# Patient Record
Sex: FEMALE | Race: WHITE
[De-identification: names, ages, dates, MRNs, and addresses within clinical notes are randomized per-mention and may not be internally consistent; named-entity substitution may affect disease eponyms.]

---

## 2018-03-16 ENCOUNTER — HOSPITAL ENCOUNTER (OUTPATIENT)
Dept: HOSPITAL 61 - KCIC US | Age: 53
Discharge: HOME | End: 2018-03-16
Attending: FAMILY MEDICINE
Payer: COMMERCIAL

## 2018-03-16 DIAGNOSIS — R60.0: ICD-10-CM

## 2018-03-16 DIAGNOSIS — M17.12: Primary | ICD-10-CM

## 2018-03-16 PROCEDURE — 93971 EXTREMITY STUDY: CPT

## 2018-03-16 PROCEDURE — 73562 X-RAY EXAM OF KNEE 3: CPT

## 2020-07-29 ENCOUNTER — HOSPITAL ENCOUNTER (OUTPATIENT)
Dept: HOSPITAL 61 - KCIC MAMMO | Age: 55
Discharge: HOME | End: 2020-07-29
Attending: NURSE PRACTITIONER
Payer: COMMERCIAL

## 2020-07-29 DIAGNOSIS — Z12.31: Primary | ICD-10-CM

## 2020-07-29 DIAGNOSIS — N64.89: ICD-10-CM

## 2020-07-29 PROCEDURE — 77067 SCR MAMMO BI INCL CAD: CPT

## 2020-07-29 NOTE — [facility]
EXAM: Bilateral screening mammogram.

 

HISTORY: 55-year-old female presents for screening mammography.

 

TECHNIQUE: Full-field digital craniocaudal and mediolateral oblique views 

of both breasts are obtained for evaluation. Computer aided detection with

BMEYED software version 9.3 was applied.

 

COMPARISON: None. This is baseline mammogram.

 

BREAST PARENCHYMAL DENSITY: Level B - Scattered fibroglandular densities.

 

FINDINGS: There is no suspicious mass, calcification or distortion within 

either breast. There are few benign calcifications bilaterally.

 

IMPRESSION: BI-RADS Category 2: Benign finding(s). 

 

RECOMMENDATION: Annual mammography is recommended.

 

If your mammogram demonstrates that you have dense breast tissue, which 

could hide abnormalities, and if you have other risk factors for breast 

cancer that have been identified, you might benefit from supplemental 

screening tests that may be suggested by your ordering physician.  Dense 

breast tissue, in and of itself, is a relatively common condition.  This 

information is not provided to cause undue concern, but rather to raise 

your awareness and to promote discussion with your physician regarding the

presence of other risk factors, in addition to dense breast tissue. A 

report of your mammography results will be sent to you and your physician.

You should contact your physician if you have any questions or concerns 

regarding this report.  

 

Mammography is a sensitive method for finding small breast cancers, but it

does not detect them all and is not a substitute for careful clinical 

examination.  A negative mammogram does not negate a clinically suspicious

finding and should not result in delay in biopsying a clinically 

suspicious abnormality.

 

PQRS compliance statement -  Patient information was entered into a 

reminder system with a target due date for the next mammogram. 

 

"Our facility is accredited by the [employer] 

Mammography Program."

 

Electronically signed by: Joanna Diamond MD (7/29/2020 3:41 PM) [email or web address]

## 2020-09-18 ENCOUNTER — HOSPITAL ENCOUNTER (OUTPATIENT)
Dept: HOSPITAL 61 - ER | Age: 55
Setting detail: OBSERVATION
LOS: 1 days | Discharge: HOME | End: 2020-09-19
Attending: STUDENT IN AN ORGANIZED HEALTH CARE EDUCATION/TRAINING PROGRAM | Admitting: STUDENT IN AN ORGANIZED HEALTH CARE EDUCATION/TRAINING PROGRAM
Payer: COMMERCIAL

## 2020-09-18 VITALS — BODY MASS INDEX: 39.33 KG/M2 | HEIGHT: 66 IN | WEIGHT: 244.71 LBS

## 2020-09-18 VITALS — DIASTOLIC BLOOD PRESSURE: 60 MMHG | SYSTOLIC BLOOD PRESSURE: 132 MMHG

## 2020-09-18 VITALS — SYSTOLIC BLOOD PRESSURE: 131 MMHG | DIASTOLIC BLOOD PRESSURE: 59 MMHG

## 2020-09-18 VITALS — DIASTOLIC BLOOD PRESSURE: 51 MMHG | SYSTOLIC BLOOD PRESSURE: 125 MMHG

## 2020-09-18 VITALS — SYSTOLIC BLOOD PRESSURE: 137 MMHG | DIASTOLIC BLOOD PRESSURE: 65 MMHG

## 2020-09-18 DIAGNOSIS — G47.33: ICD-10-CM

## 2020-09-18 DIAGNOSIS — I48.91: ICD-10-CM

## 2020-09-18 DIAGNOSIS — R07.89: ICD-10-CM

## 2020-09-18 DIAGNOSIS — N17.0: ICD-10-CM

## 2020-09-18 DIAGNOSIS — Z90.710: ICD-10-CM

## 2020-09-18 DIAGNOSIS — U07.1: Primary | ICD-10-CM

## 2020-09-18 DIAGNOSIS — I10: ICD-10-CM

## 2020-09-18 LAB
ANION GAP SERPL CALC-SCNC: 10 MMOL/L (ref 6–14)
BASOPHILS # BLD AUTO: 0 X10^3/UL (ref 0–0.2)
BASOPHILS NFR BLD: 0 % (ref 0–3)
BUN SERPL-MCNC: 17 MG/DL (ref 7–20)
CALCIUM SERPL-MCNC: 9.4 MG/DL (ref 8.5–10.1)
CHLORIDE SERPL-SCNC: 100 MMOL/L (ref 98–107)
CO2 SERPL-SCNC: 24 MMOL/L (ref 21–32)
CREAT SERPL-MCNC: 1.2 MG/DL (ref 0.6–1)
EOSINOPHIL NFR BLD: 0 X10^3/UL (ref 0–0.7)
EOSINOPHIL NFR BLD: 1 % (ref 0–3)
ERYTHROCYTE [DISTWIDTH] IN BLOOD BY AUTOMATED COUNT: 14.1 % (ref 11.5–14.5)
GFR SERPLBLD BASED ON 1.73 SQ M-ARVRAT: 46.6 ML/MIN
GLUCOSE SERPL-MCNC: 204 MG/DL (ref 70–99)
HCT VFR BLD CALC: 40.1 % (ref 36–47)
HGB BLD-MCNC: 13.5 G/DL (ref 12–15.5)
LYMPHOCYTES # BLD: 1 X10^3/UL (ref 1–4.8)
LYMPHOCYTES NFR BLD AUTO: 17 % (ref 24–48)
MCH RBC QN AUTO: 29 PG (ref 25–35)
MCHC RBC AUTO-ENTMCNC: 34 G/DL (ref 31–37)
MCV RBC AUTO: 85 FL (ref 79–100)
MONO #: 0.3 X10^3/UL (ref 0–1.1)
MONOCYTES NFR BLD: 5 % (ref 0–9)
NEUT #: 4.4 X10^3/UL (ref 1.8–7.7)
NEUTROPHILS NFR BLD AUTO: 77 % (ref 31–73)
PLATELET # BLD AUTO: 182 X10^3/UL (ref 140–400)
POTASSIUM SERPL-SCNC: 4.6 MMOL/L (ref 3.5–5.1)
RBC # BLD AUTO: 4.7 X10^6/UL (ref 3.5–5.4)
SODIUM SERPL-SCNC: 134 MMOL/L (ref 136–145)
WBC # BLD AUTO: 5.7 X10^3/UL (ref 4–11)

## 2020-09-18 PROCEDURE — 96374 THER/PROPH/DIAG INJ IV PUSH: CPT

## 2020-09-18 PROCEDURE — 93005 ELECTROCARDIOGRAM TRACING: CPT

## 2020-09-18 PROCEDURE — 84484 ASSAY OF TROPONIN QUANT: CPT

## 2020-09-18 PROCEDURE — 80048 BASIC METABOLIC PNL TOTAL CA: CPT

## 2020-09-18 PROCEDURE — 99285 EMERGENCY DEPT VISIT HI MDM: CPT

## 2020-09-18 PROCEDURE — 71045 X-RAY EXAM CHEST 1 VIEW: CPT

## 2020-09-18 PROCEDURE — 85379 FIBRIN DEGRADATION QUANT: CPT

## 2020-09-18 PROCEDURE — 85025 COMPLETE CBC W/AUTO DIFF WBC: CPT

## 2020-09-18 PROCEDURE — 96361 HYDRATE IV INFUSION ADD-ON: CPT

## 2020-09-18 PROCEDURE — G0379 DIRECT REFER HOSPITAL OBSERV: HCPCS

## 2020-09-18 PROCEDURE — 84702 CHORIONIC GONADOTROPIN TEST: CPT

## 2020-09-18 PROCEDURE — 36415 COLL VENOUS BLD VENIPUNCTURE: CPT

## 2020-09-18 PROCEDURE — G0378 HOSPITAL OBSERVATION PER HR: HCPCS

## 2020-09-18 RX ADMIN — OXYCODONE HYDROCHLORIDE AND ACETAMINOPHEN SCH MG: 500 TABLET ORAL at 21:25

## 2020-09-18 RX ADMIN — OXYCODONE HYDROCHLORIDE AND ACETAMINOPHEN SCH MG: 500 TABLET ORAL at 15:38

## 2020-09-18 NOTE — PDOC3
Discharge Summary


Visit Information


Date of Admission:  Sep 18, 2020


Date of Discharge:  Sep 19, 2020


Final Diagnosis


Problems


Medical Problems:


(1) EVERARDO (acute kidney injury)


Status: Acute  





(2) Chest pain


Status: Acute  





(3) COVID-19


Status: Acute  











Brief Hospital Course


Allergies





                                    Allergies








Coded Allergies Type Severity Reaction Last Updated Verified


 


  No Known Drug Allergies    9/18/20 No








Vital Signs





Vital Signs








  Date Time  Temp Pulse Resp B/P (MAP) Pulse Ox O2 Delivery O2 Flow Rate FiO2


 


9/18/20 15:02 97.6 62 18 131/59 (83) 95 Room Air  





 97.6       








Lab Results





Laboratory Tests








Test


 9/18/20


10:14 9/18/20


13:23 9/18/20


16:05


 


White Blood Count


 5.7 x10^3/uL


(4.0-11.0) 


 





 


Red Blood Count


 4.70 x10^6/uL


(3.50-5.40) 


 





 


Hemoglobin


 13.5 g/dL


(12.0-15.5) 


 





 


Hematocrit


 40.1 %


(36.0-47.0) 


 





 


Mean Corpuscular Volume 85 fL ()   


 


Mean Corpuscular Hemoglobin 29 pg (25-35)   


 


Mean Corpuscular Hemoglobin


Concent 34 g/dL


(31-37) 


 





 


Red Cell Distribution Width


 14.1 %


(11.5-14.5) 


 





 


Platelet Count


 182 x10^3/uL


(140-400) 


 





 


Neutrophils (%) (Auto) 77 % (31-73)   


 


Lymphocytes (%) (Auto) 17 % (24-48)   


 


Monocytes (%) (Auto) 5 % (0-9)   


 


Eosinophils (%) (Auto) 1 % (0-3)   


 


Basophils (%) (Auto) 0 % (0-3)   


 


Neutrophils # (Auto)


 4.4 x10^3/uL


(1.8-7.7) 


 





 


Lymphocytes # (Auto)


 1.0 x10^3/uL


(1.0-4.8) 


 





 


Monocytes # (Auto)


 0.3 x10^3/uL


(0.0-1.1) 


 





 


Eosinophils # (Auto)


 0.0 x10^3/uL


(0.0-0.7) 


 





 


Basophils # (Auto)


 0.0 x10^3/uL


(0.0-0.2) 


 





 


D-Dimer (Sonya)


 0.39 ug/mlFEU


(0.00-0.50) 


 





 


Maternal Serum HCG Beta


Subunit 1 mIU/mL (0-5) 


 


 





 


Sodium Level


 134 mmol/L


(136-145) 


 





 


Potassium Level


 4.6 mmol/L


(3.5-5.1) 


 





 


Chloride Level


 100 mmol/L


() 


 





 


Carbon Dioxide Level


 24 mmol/L


(21-32) 


 





 


Anion Gap 10 (6-14)   


 


Blood Urea Nitrogen


 17 mg/dL


(7-20) 


 





 


Creatinine


 1.2 mg/dL


(0.6-1.0) 


 





 


Estimated GFR


(Cockcroft-Gault) 46.6 


 


 





 


Glucose Level


 204 mg/dL


(70-99) 


 





 


Calcium Level


 9.4 mg/dL


(8.5-10.1) 


 





 


Troponin I Quantitative


 < 0.017 ng/mL


(0.000-0.055) < 0.017 ng/mL


(0.000-0.055) < 0.017 ng/mL


(0.000-0.055)








Laboratory Tests








Test


 9/18/20


10:14 9/18/20


13:23 9/18/20


16:05


 


White Blood Count


 5.7 x10^3/uL


(4.0-11.0) 


 





 


Red Blood Count


 4.70 x10^6/uL


(3.50-5.40) 


 





 


Hemoglobin


 13.5 g/dL


(12.0-15.5) 


 





 


Hematocrit


 40.1 %


(36.0-47.0) 


 





 


Mean Corpuscular Volume 85 fL ()   


 


Mean Corpuscular Hemoglobin 29 pg (25-35)   


 


Mean Corpuscular Hemoglobin


Concent 34 g/dL


(31-37) 


 





 


Red Cell Distribution Width


 14.1 %


(11.5-14.5) 


 





 


Platelet Count


 182 x10^3/uL


(140-400) 


 





 


Neutrophils (%) (Auto) 77 % (31-73)   


 


Lymphocytes (%) (Auto) 17 % (24-48)   


 


Monocytes (%) (Auto) 5 % (0-9)   


 


Eosinophils (%) (Auto) 1 % (0-3)   


 


Basophils (%) (Auto) 0 % (0-3)   


 


Neutrophils # (Auto)


 4.4 x10^3/uL


(1.8-7.7) 


 





 


Lymphocytes # (Auto)


 1.0 x10^3/uL


(1.0-4.8) 


 





 


Monocytes # (Auto)


 0.3 x10^3/uL


(0.0-1.1) 


 





 


Eosinophils # (Auto)


 0.0 x10^3/uL


(0.0-0.7) 


 





 


Basophils # (Auto)


 0.0 x10^3/uL


(0.0-0.2) 


 





 


D-Dimer (Sonya)


 0.39 ug/mlFEU


(0.00-0.50) 


 





 


Maternal Serum HCG Beta


Subunit 1 mIU/mL (0-5) 


 


 





 


Sodium Level


 134 mmol/L


(136-145) 


 





 


Potassium Level


 4.6 mmol/L


(3.5-5.1) 


 





 


Chloride Level


 100 mmol/L


() 


 





 


Carbon Dioxide Level


 24 mmol/L


(21-32) 


 





 


Anion Gap 10 (6-14)   


 


Blood Urea Nitrogen


 17 mg/dL


(7-20) 


 





 


Creatinine


 1.2 mg/dL


(0.6-1.0) 


 





 


Estimated GFR


(Cockcroft-Gault) 46.6 


 


 





 


Glucose Level


 204 mg/dL


(70-99) 


 





 


Calcium Level


 9.4 mg/dL


(8.5-10.1) 


 





 


Troponin I Quantitative


 < 0.017 ng/mL


(0.000-0.055) < 0.017 ng/mL


(0.000-0.055) < 0.017 ng/mL


(0.000-0.055)








Brief Hospital Course


Ms. Hauser  is a 55 old female who presented with Chest pain. Troponins were 

negative x 3.  Discussed with patient is likely her symptoms are due to her 

COVID-19 infection.  She was stable for discharge, recommend self quarantine and

return to ER if any new symptoms develop.





Discharge Information


Condition at Discharge:  Stable


Disposition/Orders:  D/C to Home


Scheduled


Sulfamethoxazole/Trimethoprim (Sulfamethoxazole-Tmp Ss Tablet) 1 Each Tablet, 1 

TAB PO BID for left leg infection for 3 Days, #6 Ref 0 (Reported)


   Entered as Reported by: DOREEN GONZALES on 9/18/20 1518


   Last Taken: Unknown Dose on 9/18/20 0800     Last Action: New Order on 

9/18/20 1518 by DOREEN GONZALES





Justicifation of Admission Dx:


Justifications for Admission:


Justification of Admission Dx:  Yes


Hypertension:  Cresendo Worsening of Sym











HARJINDER BREWER MD            Sep 18, 2020 19:16

## 2020-09-18 NOTE — EKG
[facility]

              [address] [place] [address]

Test Date:    2020               Test Time:    09:48:44

Pat Name:     RICHY RAHMAN              Department:   

Patient ID:   [id number]           Room:          

Gender:       F                        Technician:   

:          1965               Requested By: TAMMY MARTELL

Order Number: [id number]           Reading MD:     

                                 Measurements

Intervals                              Axis          

Rate:         68                       P:            53

ME:           150                      QRS:          37

QRSD:         100                      T:            109

QT:           408                                    

QTc:          434                                    

                           Interpretive Statements

SINUS RHYTHM

LEFT ATRIAL ABNORMALITY

T ABNORMALITY IN HIGH LATERAL LEADS

ABNORMAL ECG

RI6.02

No previous ECG available for comparison

## 2020-09-18 NOTE — PDOC1
History and Physical


Date of Admission


Date of Admission


DATE: 9/18/20 


TIME: 12:22





Identification/Chief Complaint


Chief Complaint


Chest pain





Source


Source:  Patient





History of Present Illness


History of Present Illness


Patient is a 55-year-old female with past medical history of obstructive sleep 

apnea and A. fib, who presents to the ER with chest heaviness for 1 day.  She 

reports substernal chest heaviness, but denies significant pain.  She denies any

pleuritic symptoms.  She admits to some associated nausea yesterday.  She does 

not take any medications for history of A. fib.  She denies any shortness of 

breath.  States she was COVID-19 positive one week ago, she is positive on swab 

in ER.





Past Medical History


Past Medical History


CHARITY, A. fib





Past Surgical History


Past Surgical History


Hysterectomy





Family History


Family History:  Coronary Artery Disease





Social History


Smoke:  No


ALCOHOL:  none


Drugs:  None





Current Problem List


Problem List


Problems


Medical Problems:


(1) EVERARDO (acute kidney injury)


Status: Acute  





(2) Chest pain


Status: Acute  





(3) COVID-19


Status: Acute  











Current Medications


Current Medications





Current Medications


Sodium Chloride 1,000 ml @  1,000 mls/hr 1X  ONCE IV  Last administered on 

9/18/20at 10:39;  Start 9/18/20 at 10:30;  Stop 9/18/20 at 11:29;  Status DC


Ondansetron HCl (Zofran) 4 mg 1X  ONCE IVP  Last administered on 9/18/20at 

10:39;  Start 9/18/20 at 10:30;  Stop 9/18/20 at 10:31;  Status DC


Ibuprofen (Motrin) 600 mg 1X  ONCE PO  Last administered on 9/18/20at 10:38;  

Start 9/18/20 at 10:30;  Stop 9/18/20 at 10:31;  Status DC


Magnesium Sulfate 50 ml @ 50 mls/hr 1X  ONCE IV ;  Start 9/18/20 at 10:30;  Stop

9/18/20 at 10:32;  Status DC


Aspirin (Nirmala Aspirin) 325 mg 1X  ONCE PO  Last administered on 9/18/20at 

11:50;  Start 9/18/20 at 11:15;  Stop 9/18/20 at 11:16;  Status DC





Allergies


Allergies:  


Coded Allergies:  


     No Known Drug Allergies (Unverified , 9/18/20)





ROS


Review of System


GENERAL:  No history of weight change, weakness or fevers.


SKIN:  No bruising, hair changes or rashes.


EYES:  No blurred, double or loss of vision.


NOSE AND THROAT:  No history of nosebleeds, hoarseness or sore throat.


HEART:  Chest pressure, denies palpitations.


LUNGS:  Denies cough, hemoptysis, wheezing or shortness of breath.


GASTROINTESTINAL: Nausea. Denies abdominal pain.


GENITOURINARY: Denies dysuria, frequency, urgency, hematuria.


NEUROLOGIC:  Denies history of numbness, tingling, tremor or weakness.


PSYCHIATRIC: Denies anxiety, denies depression.


ENDOCRINE:  No history of heat or cold intolerance, polyuria or polydipsia.


EXTREMITIES:  Denies muscle weakness, joint pain, pain on walking or stiffness.





Physical Exam


Physical Exam


General:  Alert, Oriented X3, Cooperative, No acute distress


HEENT:  PERRLA, EOMI


Lungs:  Clear to auscultation, Normal air movement


Heart:  RRR, no murmurs


Cardiovascular:  S1, S2


Abdomen:  Normal bowel sounds, Soft, No tenderness


Extremities:  No clubbing, No cyanosis


Skin:  No rashes, No significant lesion


Neuro:  Normal speech, Normal tone, Sensation intact


Psych/Mental Status:  Mental status NL, Mood NL





Vitals


Vitals





Vital Signs








  Date Time  Temp Pulse Resp B/P (MAP) Pulse Ox O2 Delivery O2 Flow Rate FiO2


 


9/18/20 10:03  68 16 137/64 (88) 97 Room Air  











Labs


Labs





Laboratory Tests








Test


 9/18/20


10:14


 


White Blood Count


 5.7 x10^3/uL


(4.0-11.0)


 


Red Blood Count


 4.70 x10^6/uL


(3.50-5.40)


 


Hemoglobin


 13.5 g/dL


(12.0-15.5)


 


Hematocrit


 40.1 %


(36.0-47.0)


 


Mean Corpuscular Volume 85 fL () 


 


Mean Corpuscular Hemoglobin 29 pg (25-35) 


 


Mean Corpuscular Hemoglobin


Concent 34 g/dL


(31-37)


 


Red Cell Distribution Width


 14.1 %


(11.5-14.5)


 


Platelet Count


 182 x10^3/uL


(140-400)


 


Neutrophils (%) (Auto) 77 % (31-73) 


 


Lymphocytes (%) (Auto) 17 % (24-48) 


 


Monocytes (%) (Auto) 5 % (0-9) 


 


Eosinophils (%) (Auto) 1 % (0-3) 


 


Basophils (%) (Auto) 0 % (0-3) 


 


Neutrophils # (Auto)


 4.4 x10^3/uL


(1.8-7.7)


 


Lymphocytes # (Auto)


 1.0 x10^3/uL


(1.0-4.8)


 


Monocytes # (Auto)


 0.3 x10^3/uL


(0.0-1.1)


 


Eosinophils # (Auto)


 0.0 x10^3/uL


(0.0-0.7)


 


Basophils # (Auto)


 0.0 x10^3/uL


(0.0-0.2)


 


D-Dimer (Sonya)


 0.39 ug/mlFEU


(0.00-0.50)


 


Maternal Serum HCG Beta


Subunit 1 mIU/mL (0-5) 





 


Sodium Level


 134 mmol/L


(136-145)


 


Potassium Level


 4.6 mmol/L


(3.5-5.1)


 


Chloride Level


 100 mmol/L


()


 


Carbon Dioxide Level


 24 mmol/L


(21-32)


 


Anion Gap 10 (6-14) 


 


Blood Urea Nitrogen


 17 mg/dL


(7-20)


 


Creatinine


 1.2 mg/dL


(0.6-1.0)


 


Estimated GFR


(Cockcroft-Gault) 46.6 





 


Glucose Level


 204 mg/dL


(70-99)


 


Calcium Level


 9.4 mg/dL


(8.5-10.1)


 


Troponin I Quantitative


 < 0.017 ng/mL


(0.000-0.055)








Laboratory Tests








Test


 9/18/20


10:14


 


White Blood Count


 5.7 x10^3/uL


(4.0-11.0)


 


Red Blood Count


 4.70 x10^6/uL


(3.50-5.40)


 


Hemoglobin


 13.5 g/dL


(12.0-15.5)


 


Hematocrit


 40.1 %


(36.0-47.0)


 


Mean Corpuscular Volume 85 fL () 


 


Mean Corpuscular Hemoglobin 29 pg (25-35) 


 


Mean Corpuscular Hemoglobin


Concent 34 g/dL


(31-37)


 


Red Cell Distribution Width


 14.1 %


(11.5-14.5)


 


Platelet Count


 182 x10^3/uL


(140-400)


 


Neutrophils (%) (Auto) 77 % (31-73) 


 


Lymphocytes (%) (Auto) 17 % (24-48) 


 


Monocytes (%) (Auto) 5 % (0-9) 


 


Eosinophils (%) (Auto) 1 % (0-3) 


 


Basophils (%) (Auto) 0 % (0-3) 


 


Neutrophils # (Auto)


 4.4 x10^3/uL


(1.8-7.7)


 


Lymphocytes # (Auto)


 1.0 x10^3/uL


(1.0-4.8)


 


Monocytes # (Auto)


 0.3 x10^3/uL


(0.0-1.1)


 


Eosinophils # (Auto)


 0.0 x10^3/uL


(0.0-0.7)


 


Basophils # (Auto)


 0.0 x10^3/uL


(0.0-0.2)


 


D-Dimer (Sonya)


 0.39 ug/mlFEU


(0.00-0.50)


 


Maternal Serum HCG Beta


Subunit 1 mIU/mL (0-5) 





 


Sodium Level


 134 mmol/L


(136-145)


 


Potassium Level


 4.6 mmol/L


(3.5-5.1)


 


Chloride Level


 100 mmol/L


()


 


Carbon Dioxide Level


 24 mmol/L


(21-32)


 


Anion Gap 10 (6-14) 


 


Blood Urea Nitrogen


 17 mg/dL


(7-20)


 


Creatinine


 1.2 mg/dL


(0.6-1.0)


 


Estimated GFR


(Cockcroft-Gault) 46.6 





 


Glucose Level


 204 mg/dL


(70-99)


 


Calcium Level


 9.4 mg/dL


(8.5-10.1)


 


Troponin I Quantitative


 < 0.017 ng/mL


(0.000-0.055)











Images


Images


EXAMINATION: CHEST AP ONLY


 


CLINICAL HISTORY: Reason: CP / Spl. Instructions:  / History: 


 


EXAM DATE/TIME: 9/18/2020 10:36 AM


 


COMPARISON: None


 


 


FINDINGS: 


 


Lines, Tubes, and Devices: None.


 


Cardiomediastinal Silhouette: Within normal limits.


 


Lungs and Pleura: No evidence of focal airspace consolidation or pleural 


effusion.  Pulmonary vasculature unremarkable.


 


Bones and Soft Tissues: Degenerative changes of the thoracic spine.


 


 


IMPRESSION:


 


No evidence of acute cardiopulmonary abnormality.





VTE Prophylaxis Ordered


VTE Prophylaxis Devices:  Yes


VTE Pharmacological Prophylaxi:  No





Assessment/Plan


Assessment/Plan


ACS


AKA


Vasomotor nephropathy


COVID-19 positive





Plan: We will trend cardiac troponins


Consult cardiology if indicated


Patient is COVID-19 positive, but denies any shortness of breath


If serial troponins remain undetectable, will discharge patient with outpatient 

follow-up


VTE prophylaxis


Cardiac diet


Full code





Justifications for Admission


Other Justification














HARJINDER BREWER MD            Sep 18, 2020 12:49

## 2020-09-18 NOTE — RAD
EXAMINATION: CHEST AP ONLY

 

CLINICAL HISTORY: Reason: CP / Spl. Instructions:  / History: 

 

EXAM DATE/TIME: 9/18/2020 10:36 AM

 

COMPARISON: None

 

 

FINDINGS: 

 

Lines, Tubes, and Devices: None.

 

Cardiomediastinal Silhouette: Within normal limits.

 

Lungs and Pleura: No evidence of focal airspace consolidation or pleural 

effusion.  Pulmonary vasculature unremarkable.

 

Bones and Soft Tissues: Degenerative changes of the thoracic spine.

 

 

IMPRESSION:

 

No evidence of acute cardiopulmonary abnormality.

 

Electronically signed by: Flako Steven DO (9/18/2020 11:05 AM) [id number]

## 2020-09-18 NOTE — PHYS DOC
General Adult


EDM:


Chief Complaint:  CHEST PAIN





HPI:


HPI:





The history was obtained from the patient.  Patient is a 55-year-old female with

PMH atrial fibrillation who presents with a chief complaint of chest pain.  

Patient states she has had intermittent chest pain over the past 3 days.  She 

notes the pain seems to be more constant today.  States it is a substernal 

aching pain that is occasionally sharp.  She has tried Tylenol at home with no 

relief.  She states nothing seems to exacerbate her symptoms including exertion.

 She does note nausea with 2 episodes of nonbloody nonbilious emesis yesterday. 

States her oral intake yesterday was minimal.  She denies any cough, runny nose,

or sore throat.  She states she did have the symptoms 1 week ago however.  She 

notes she had a temperature of 102.05 days ago.  She states approximately 8 days

ago she was diagnosed with coronavirus.  She denies any shortness of breath.  

She denies any current cough.  Denies syncope.  Denies back pain.  Denies any 

history of coronary artery disease.  She states she did have a stress test 

performed years ago that was normal.  She does not follow closely with the 

physicians he states.  She states her atrial fibrillation has not been managed 

with medication and she does not take blood thinners.





Review of Systems:


Review of Systems:


Constitutional:   Denies fever or chills. []


Eyes:   Denies change in visual acuity. []


HENT:   Denies nasal congestion or sore throat. [] 


Respiratory:   Denies cough or shortness of breath. [] 


Cardiovascular: Positive for chest pain


GI:   Positive for nausea and vomiting


:  Denies dysuria. [] 


Musculoskeletal:   Denies back pain or joint pain. [] 


Integument:   Denies rash. [] 


Neurologic:   Denies headache, focal weakness or sensory changes. [] 


Endocrine:   Denies polyuria or polydipsia. [] 


Lymphatic:  Denies swollen glands. [] 


Psychiatric:  Denies depression or anxiety. []





Heart Score:


HEART Score for Chest Pain:  








HEART Score for Chest Pain Response (Comments) Value


 


History Moderately Suspicious 1


 


ECG Nonspecific Repolarizatio 1


 


Age >45 - < 65 1


 


Risk Factors                            1 or 2 Risk Factors 1


 


Troponin < Normal Limit 0


 


Total  4








Risk Factors:


Risk Factors:  DM, Current or recent (<one month) smoker, HTN, HLP, family 

history of CAD, obesity.


Risk Scores:


Score 0 - 3:  2.5% MACE over next 6 weeks - Discharge Home


Score 4 - 6:  20.3% MACE over next 6 weeks - Admit for Clinical Observation


Score 7 - 10:  72.7% MACE over next 6 weeks - Early Invasive Strategies





Physical Exam:


PE:





Constitutional: Well developed, well nourished, no acute distress, non-toxic 

appearance. []


HENT: Normocephalic, atraumatic, bilateral external ears normal, oropharynx 

moist, no oral exudates, nose normal. []


Eyes: PERRLA, EOMI, conjunctiva normal, no discharge. [] 


Neck: Normal range of motion, no tenderness, supple, no stridor. [] 


Cardiovascular:Heart rate regular rhythm, no murmur []


Lungs & Thorax:  Bilateral breath sounds clear to auscultation []


Abdomen: Soft, nontender, nonacute abdomen.  No involuntary guarding or rigidity

 noted.  No acute peritonitis.


Skin: Warm, dry, no erythema, no rash. [] 


Back: No tenderness, no CVA tenderness. [] 


Extremities: No tenderness, no cyanosis, no clubbing, ROM intact, no edema. [] 


Neurologic: Alert and oriented X 3, normal motor function, normal sensory 

function, no focal deficits noted. []


Psychologic: Affect normal, judgement normal, mood normal. []





Current Patient Data:


Labs:





Laboratory Tests








Test


 20


10:14


 


White Blood Count 5.7 x10^3/uL 


 


Red Blood Count 4.70 x10^6/uL 


 


Hemoglobin 13.5 g/dL 


 


Hematocrit 40.1 % 


 


Mean Corpuscular Volume 85 fL 


 


Mean Corpuscular Hemoglobin 29 pg 


 


Mean Corpuscular Hemoglobin


Concent 34 g/dL 





 


Red Cell Distribution Width 14.1 % 


 


Platelet Count 182 x10^3/uL 


 


Neutrophils (%) (Auto) 77 % 


 


Lymphocytes (%) (Auto) 17 % 


 


Monocytes (%) (Auto) 5 % 


 


Eosinophils (%) (Auto) 1 % 


 


Basophils (%) (Auto) 0 % 


 


Neutrophils # (Auto) 4.4 x10^3/uL 


 


Lymphocytes # (Auto) 1.0 x10^3/uL 


 


Monocytes # (Auto) 0.3 x10^3/uL 


 


Eosinophils # (Auto) 0.0 x10^3/uL 


 


Basophils # (Auto) 0.0 x10^3/uL 


 


D-Dimer (Sonya) 0.39 ug/mlFEU 


 


Maternal Serum HCG Beta


Subunit 1 mIU/mL 





 


Sodium Level 134 mmol/L 


 


Potassium Level 4.6 mmol/L 


 


Chloride Level 100 mmol/L 


 


Carbon Dioxide Level 24 mmol/L 


 


Anion Gap 10 


 


Blood Urea Nitrogen 17 mg/dL 


 


Creatinine 1.2 mg/dL 


 


Estimated GFR


(Cockcroft-Gault) 46.6 





 


Glucose Level 204 mg/dL 


 


Calcium Level 9.4 mg/dL 


 


Troponin I Quantitative < 0.017 ng/mL 








Current Medications








 Medications


  (Trade)  Dose


 Ordered  Sig/Sky


 Route


 PRN Reason  Start Time


 Stop Time Status Last Admin


Dose Admin


 


 Sodium Chloride  1,000 ml @ 


 1,000 mls/hr  1X  ONCE


 IV


   20 10:30


 20 11:29  20 10:39





 


 Ondansetron HCl


  (Zofran)  4 mg  1X  ONCE


 IVP


   20 10:30


 20 10:31 DC 20 10:39





 


 Ibuprofen


  (Motrin)  600 mg  1X  ONCE


 PO


   20 10:30


 20 10:31 DC 20 10:38





 


 Magnesium Sulfate  50 ml @ 50


 mls/hr  1X  ONCE


 IV


   20 10:30


 20 10:32 DC  











Vital Signs:





Vital Signs








  Date Time  Temp Pulse Resp B/P (MAP) Pulse Ox O2 Delivery O2 Flow Rate FiO2


 


20 10:03  68 16 137/64 (88) 97 Room Air  











EKG:


EKG:


[] EKG consistent with normal sinus rhythm.  Ventricular rate of 68 bpm.  Axis 

normal.  Intervals normal.  No acute ischemic changes appreciated.





Radiology/Procedures:


Radiology/Procedures:


[facility]


                    [address]  [place], [place] [id number] [phone]


                                        


                                 IMAGING REPORT





                                     Signed





PATIENT: RICHY RAHMAN      ACCOUNT: [id number]     MRN#: [id number]


: 1965           LOCATION: ER              AGE: 55


SEX: F                    EXAM DT: 20         ACCESSION#: [id number]


STATUS: REG ER            ORD. PHYSICIAN: TAMMY MARTELL DO


REASON: CP


PROCEDURE: CHEST AP ONLY





EXAMINATION: CHEST AP ONLY


 


CLINICAL HISTORY: Reason: CP / Spl. Instructions:  / History: 


 


EXAM DATE/TIME: 2020 10:36 AM


 


COMPARISON: None


 


 


FINDINGS: 


 


Lines, Tubes, and Devices: None.


 


Cardiomediastinal Silhouette: Within normal limits.


 


Lungs and Pleura: No evidence of focal airspace consolidation or pleural 


effusion.  Pulmonary vasculature unremarkable.


 


Bones and Soft Tissues: Degenerative changes of the thoracic spine.


 


 


IMPRESSION:


 


No evidence of acute cardiopulmonary abnormality.


 


Electronically signed by: Flako Stover DO (2020 11:05 AM) [id number]














DICTATED and SIGNED BY:     FLAKO STOVER DO


DATE:     20 1105


[]





Course & Med Decision Making:


Course & Med Decision Making


Pertinent Labs and Imaging studies reviewed. (See chart for details)





[] Patient is an overall well-appearing 55-year-old female who presents with c

hief complaint of intermittent chest pain over the past few days.  Initial vital

 signs unremarkable.  EKG without acute ischemic changes.  Initial troponin 

negative.  D-dimer negative.  Creatinine mildly elevated at 1.2.  I do feel the 

patient would benefit from hospitalization for further troponin testing.  She is

 agreeable to this.  CT will be deferred as her d-dimer is negative.  Patient 

symptoms were well controlled in the emergency department.





COVID-19 CRITERIA:    The patient was evaluated during the global COVID-19 

pandemic, and that diagnosis was suspected/considered upon their initial 

presentation.  Their evaluation, treatment and testing was consistent with 

current guidelines for patients who present with complaints or symptoms that may

 be related to COVID-19.





Dragon Disclaimer:


Dragon Disclaimer:


This electronic medical record was generated, in whole or in part, using a voice

 recognition dictation system.





Departure


Departure


Impression:  


   Primary Impression:  


   Chest pain


   Qualified Codes:  R07.9 - Chest pain, unspecified


   Additional Impressions:  


   EVERARDO (acute kidney injury)


   COVID-19


Disposition:   ADMITTED AS INPATIENT


Condition:  STABLE


Referrals:  


NO PCP (PCP)





Justicifation of Admission Dx:


Justifications for Admission:


Justification of Admission Dx:  Yes


Hypertension:  Cresendo Worsening of Sym











TAMMY MARTELL DO          Sep 18, 2020 10:10

## 2020-09-19 VITALS — SYSTOLIC BLOOD PRESSURE: 104 MMHG | DIASTOLIC BLOOD PRESSURE: 54 MMHG

## 2020-09-19 NOTE — PDOC
[facility] PROGRESS NOTE


Date of Service


DOS:


DATE: 9/19/20 


TIME: 07:54





Chief Complaint


Chief Complaint


Chest pain





History of Present Illness


History of Present Illness


Discussed with patient that with 3- troponins unlikely that her symptoms are 

cardiac in nature.  More likely due to her COVID-19 infection.  Recommend 

discharge and self quarantine.  Return to ER if any new symptoms or shortness of

breath develop.





Vitals/I&O


Vitals/I&O:





                                   Vital Signs








  Date Time  Temp Pulse Resp B/P (MAP) Pulse Ox O2 Delivery O2 Flow Rate FiO2


 


9/19/20 03:00 97.4 58 20 104/54 (71) 96 Room Air  





 97.4       














                                    I & O   


 


 9/18/20 9/18/20 9/19/20





 15:00 23:00 07:00


 


Intake Total 1000 ml 300 ml 


 


Output Total  0 ml 0 ml


 


Balance 1000 ml 300 ml 0 ml











Physical Exam


General:  Alert, Oriented X3


Heart:  Regular rate


Lungs:  Clear


Abdomen:  Normal bowel sounds


Extremities:  No clubbing, No cyanosis


Skin:  No rashes





Labs


Labs:





Laboratory Tests








Test


 9/18/20


10:14 9/18/20


13:23 9/18/20


16:05


 


White Blood Count


 5.7 x10^3/uL


(4.0-11.0) 


 





 


Red Blood Count


 4.70 x10^6/uL


(3.50-5.40) 


 





 


Hemoglobin


 13.5 g/dL


(12.0-15.5) 


 





 


Hematocrit


 40.1 %


(36.0-47.0) 


 





 


Mean Corpuscular Volume 85 fL ()   


 


Mean Corpuscular Hemoglobin 29 pg (25-35)   


 


Mean Corpuscular Hemoglobin


Concent 34 g/dL


(31-37) 


 





 


Red Cell Distribution Width


 14.1 %


(11.5-14.5) 


 





 


Platelet Count


 182 x10^3/uL


(140-400) 


 





 


Neutrophils (%) (Auto) 77 % (31-73)   


 


Lymphocytes (%) (Auto) 17 % (24-48)   


 


Monocytes (%) (Auto) 5 % (0-9)   


 


Eosinophils (%) (Auto) 1 % (0-3)   


 


Basophils (%) (Auto) 0 % (0-3)   


 


Neutrophils # (Auto)


 4.4 x10^3/uL


(1.8-7.7) 


 





 


Lymphocytes # (Auto)


 1.0 x10^3/uL


(1.0-4.8) 


 





 


Monocytes # (Auto)


 0.3 x10^3/uL


(0.0-1.1) 


 





 


Eosinophils # (Auto)


 0.0 x10^3/uL


(0.0-0.7) 


 





 


Basophils # (Auto)


 0.0 x10^3/uL


(0.0-0.2) 


 





 


D-Dimer (Sonya)


 0.39 ug/mlFEU


(0.00-0.50) 


 





 


Maternal Serum HCG Beta


Subunit 1 mIU/mL (0-5) 


 


 





 


Sodium Level


 134 mmol/L


(136-145) 


 





 


Potassium Level


 4.6 mmol/L


(3.5-5.1) 


 





 


Chloride Level


 100 mmol/L


() 


 





 


Carbon Dioxide Level


 24 mmol/L


(21-32) 


 





 


Anion Gap 10 (6-14)   


 


Blood Urea Nitrogen


 17 mg/dL


(7-20) 


 





 


Creatinine


 1.2 mg/dL


(0.6-1.0) 


 





 


Estimated GFR


(Cockcroft-Gault) 46.6 


 


 





 


Glucose Level


 204 mg/dL


(70-99) 


 





 


Calcium Level


 9.4 mg/dL


(8.5-10.1) 


 





 


Troponin I Quantitative


 < 0.017 ng/mL


(0.000-0.055) < 0.017 ng/mL


(0.000-0.055) < 0.017 ng/mL


(0.000-0.055)











Review of Systems


Review of Systems:


Denies shortness of breath, denies nausea, denies fever, denies chills.





Assessment and Plan


Assessmemt and Plan


Problems


Medical Problems:


(1) EVERARDO (acute kidney injury)


Status: Acute  





(2) Chest pain


Status: Acute  





(3) COVID-19


Status: Acute  











Comment


Review of Relevant


I have reviewed the following items francesca (where applicable) has been applied.


Medications:





Current Medications








 Medications


  (Trade)  Dose


 Ordered  Sig/Sky


 Route


 PRN Reason  Start Time


 Stop Time Status Last Admin


Dose Admin


 


 Sodium Chloride  1,000 ml @ 


 1,000 mls/hr  1X  ONCE


 IV


   9/18/20 10:30


 9/18/20 11:29 DC 9/18/20 10:39





 


 Ondansetron HCl


  (Zofran)  4 mg  1X  ONCE


 IVP


   9/18/20 10:30


 9/18/20 10:31 DC 9/18/20 10:39





 


 Ibuprofen


  (Motrin)  600 mg  1X  ONCE


 PO


   9/18/20 10:30


 9/18/20 10:31 DC 9/18/20 10:38





 


 Aspirin


  (Nirmala Aspirin)  325 mg  1X  ONCE


 PO


   9/18/20 11:15


 9/18/20 11:16 DC 9/18/20 11:50





 


 Ascorbic Acid


  (Vitamin C)  500 mg  BID


 PO


   9/18/20 13:00


 10/2/20 12:59  9/18/20 21:25





 


 Zinc Sulfate


  (Orazinc)  220 mg  DAILY


 PO


   9/18/20 13:00


 10/2/20 12:59  9/18/20 15:38





 


 Vitamin D


  (Vitamin D3)  5,000 unit  DAILY


 PO


   9/18/20 13:00


 10/2/20 12:59  9/18/20 15:38














Justifications for Admission


Other Justification














HARJINDER BREWER MD            Sep 19, 2020 07:55